# Patient Record
Sex: MALE | Race: WHITE | NOT HISPANIC OR LATINO | URBAN - METROPOLITAN AREA
[De-identification: names, ages, dates, MRNs, and addresses within clinical notes are randomized per-mention and may not be internally consistent; named-entity substitution may affect disease eponyms.]

---

## 2022-08-31 ENCOUNTER — EMERGENCY (EMERGENCY)
Facility: HOSPITAL | Age: 42
LOS: 0 days | Discharge: HOME | End: 2022-08-31
Attending: EMERGENCY MEDICINE | Admitting: EMERGENCY MEDICINE

## 2022-08-31 VITALS
SYSTOLIC BLOOD PRESSURE: 173 MMHG | HEART RATE: 95 BPM | RESPIRATION RATE: 18 BRPM | TEMPERATURE: 98 F | WEIGHT: 214.95 LBS | OXYGEN SATURATION: 100 % | DIASTOLIC BLOOD PRESSURE: 95 MMHG

## 2022-08-31 VITALS — SYSTOLIC BLOOD PRESSURE: 163 MMHG | DIASTOLIC BLOOD PRESSURE: 89 MMHG | HEART RATE: 94 BPM

## 2022-08-31 DIAGNOSIS — N48.30 PRIAPISM, UNSPECIFIED: ICD-10-CM

## 2022-08-31 LAB
BASE EXCESS BLDV CALC-SCNC: -17.4 MMOL/L — LOW (ref -2–3)
CA-I SERPL-SCNC: 1.13 MMOL/L — LOW (ref 1.15–1.33)
GAS PNL BLDV: 139 MMOL/L — SIGNIFICANT CHANGE UP (ref 136–145)
GAS PNL BLDV: SIGNIFICANT CHANGE UP
HCO3 BLDV-SCNC: 21 MMOL/L — LOW (ref 22–29)
HCT VFR BLDA CALC: 51 % — SIGNIFICANT CHANGE UP (ref 39–51)
HGB BLD CALC-MCNC: 17 G/DL — SIGNIFICANT CHANGE UP (ref 12.6–17.4)
LACTATE BLDV-MCNC: 14 MMOL/L — CRITICAL HIGH (ref 0.5–2)
PCO2 BLDV: 131 MMHG — HIGH (ref 42–55)
PH BLDV: 6.81 — LOW (ref 7.32–7.43)
PO2 BLDV: 25 MMHG — SIGNIFICANT CHANGE UP
POTASSIUM BLDV-SCNC: 5.2 MMOL/L — HIGH (ref 3.5–5.1)
SAO2 % BLDV: 27.5 % — SIGNIFICANT CHANGE UP

## 2022-08-31 PROCEDURE — 54220 IRRG CRPRA CAVRNOSA PRIAPISM: CPT

## 2022-08-31 PROCEDURE — 99284 EMERGENCY DEPT VISIT MOD MDM: CPT | Mod: 25

## 2022-08-31 NOTE — ED PROVIDER NOTE - OBJECTIVE STATEMENT
41 yo male, takes trazadone as sleep aid, presents to ed for painful erection for about 8 hrs, mild, aching, no radiation. Reports had intercourse last night, no use of any other medications. denies fever, chills, cp, sob, cough, numbness, tingling, dysuria, hematuria, testicular pain.

## 2022-08-31 NOTE — ED PROVIDER NOTE - PATIENT PORTAL LINK FT
You can access the FollowMyHealth Patient Portal offered by [facility] by registering at the following website: [email or web address]. By joining [employer]’s FollowMyHealth portal, you will also be able to view your health information using other applications (apps) compatible with our system.

## 2022-08-31 NOTE — ED PROVIDER NOTE - NSFOLLOWUPINSTRUCTIONS_ED_ALL_ED_FT
Our Emergency Department Referral Coordinators will be reaching out ot you in the next 24-48 hours from 9:00am to 5:00pm (Monday to Friday) with a follow up appointment. Please expect a phone call from the hospital in that time frame. If you do not receive a call or if you have any questions or concerns, you can reach them at [phone] or [phone].       Priapism      Priapism is an unwanted erection of the penis that lasts longer than 4 hours even without sexual stimulation or desire. Priapism affects males of all ages. There are three types of priapism:  •Acute ischemic priapism, also called low-flow priapism. This involves the failure of blood to flow out of the penis. With this type, erections are painful. It can lead to erectile dysfunction.      •Non-ischemic priapism, also called high-flow priapism. This involves too much flow of blood into the penis. With this type, erections are usually painless. The penis gets erect but not rigid.      •Stuttering (recurrent) priapism. This is a form of ischemic priapism in which erections may stop and start. With this type, erections usually occur during sleep and are painful. This type can lead to erectile dysfunction.        What are the causes?    This condition develops either when blood has difficulty leaving the penis (low-flow priapism) or when too much blood flows into the penis (high-flow priapism). Blood flow issues may be caused by:•Medicines, such as:  •Erectile dysfunction medicine. This is the most common cause.      •Medicine that is used to treat depression, anxiety, high blood pressure, or attention deficit hyperactivity disorder (ADHD).      •Blood thinners.      •Medical disorders, such as:  •Blood problems that are common in people who have sickle cell disease or leukemia.      •Diabetes.      •Neurological problems, such as multiple sclerosis.      •Tumors.      •An infection.        •An injury to the penis.      Other causes may include:  •Excessive alcohol use.      •Use of marijuana.      •Use of illegal drugs, such as cocaine.      In some cases, the cause is not known.      What are the signs or symptoms?    Symptoms of this condition include:  •A prolonged erection in the absence of stimulation.      •A painful erection.        How is this diagnosed?    This condition is diagnosed with a physical exam. Blood tests and imaging tests such as an ultrasound or MRI may be done to help identify the cause of the condition.      How is this treated?    Treatment for this condition depends on the cause and the type of priapism. Acute ischemic priapism should be treated right away at a hospital, where treatment may involve:  •Getting fluid and medicines for pain through an IV.      •Having a procedure to drain blood from the penis.      •Having surgery to make a passageway for blood to flow in the penis (surgical shunting).      •Having a blood transfusion if you have sickle cell disease.      No standard treatment exists for stuttering priapism. If the erections last longer than 4 hours, the treatments will be the same as the treatments for acute ischemic priapism.    Non-ischemic priapism and stuttering priapism are often managed at home. Your health care provider may prescribe oral medicines that may help control or decrease the erections.      Follow these instructions at home:    Medicines     •Take over-the-counter and prescription medicines only as told by your health care provider.      • Do not take any medicines during an episode unless you get approval from your health care provider.        General instructions       A person writing in a notebook.       A bathtub filled with water.     •Avoid drugs or alcohol if they caused the priapism. Avoiding them can help to prevent the condition from coming back.      •Avoid sexual stimulation and intercourse until your health care provider says that they are okay for you.      •Keep track of how long your erection lasts. If it does not go away in 4 hours, seek medical care.    •Follow instructions from your health care provider. Your health care provider may tell you to:  •Try exercising or taking a warm bath.      •Put a cold pack and gentle pressure on your perineum, which is the area between the anus and the scrotum.      •Take a cold shower.      •Drink enough fluid to keep your urine pale yellow.      •Empty your bladder as much as possible.        •Keep all follow-up visits. This is important.        Contact a health care provider if:    •Your pain does not improve with treatment.      •You have a fever.      •You have more pain, swelling, or redness in your genitals or your groin area.        Get help right away if:    •You have acute ischemic priapism or stuttering priapism and your erection does not go away in 4 hours.        Summary    •Priapism is an unwanted erection of the penis that usually develops without sexual stimulation or desire.      •Treatment for this condition depends on the cause and the type of priapism. Non-ischemic priapism and stuttering priapism are often managed at home. Acute ischemic priapism is usually treated at a hospital.      •Avoid drugs or alcohol if they caused the priapism. This can help to prevent the condition from coming back.      •Take over-the-counter and prescription medicines only as told by your health care provider.      •Keep track of how long your erection lasts. If it does not go away in 4 hours, seek medical care.      This information is not intended to replace advice given to you by your health care provider. Make sure you discuss any questions you have with your health care provider.

## 2022-08-31 NOTE — ED PROVIDER NOTE - CLINICAL SUMMARY MEDICAL DECISION MAKING FREE TEXT BOX
Patient presented with interaction lasted approximately 7 to 8 hours.  Patient had intercourse last night and states that was normal.  Patient took trazodone which she states he has been taking for a while now.  Went to sleep.  States that he was flaccid when he went to sleep and then when he awoke at 6 AM, states that he had an erection that persisted until arrival to ED.  Denies testicular pain, penile discharge, penile lesions, fevers or any other complaints.  Denies trauma to the penis.  On arrival patient afebrile, hemodynamically stable, but erection noted.  No overlying skin changes or testicular tenderness.  Patient circumcised.  Procedure performed as documented with aspiration of approximately 40 cc of blood on bilateral sides of penis and sample sent on VBG which is consistent with low-flow etiology.  After aspiration, injected bilateral penis with phenylephrine 100 cc each side which ultimately allowed for resolution.  Urology was consulted and evaluated patient in the ED.  Per urology, no further intervention at this time and patient may follow-up as outpatient with them.  Patient agreeable with plan, able to urinate. Agrees to return to ED for any new or worsening symptoms.

## 2022-08-31 NOTE — ED ADULT NURSE NOTE - NS PRO PASSIVE SMOKE EXP
Fax message: We have been contacted by this pt for incontinence supplies. If you feel this pt is able to get these products:    Briefs/Pull-Ups, Chuz Gloves and Pads.    -Include all assoc. Dx codes for pt so we can bill product to their insurance.  -Note; basic dz code R32 does not qualify for incontinence product.  -Please include refills or PRN. PRN = Good for 1 year.  -All Rx's must have a signature or electronic signature.  -Please don't add quantity's. As insurance allowances differ.  -If you feel this pt does not qualify please state on this form and send it back to me ASAP.  Fax copy back to [phone]   No

## 2022-08-31 NOTE — ED PROVIDER NOTE - PHYSICAL EXAMINATION
Physical Exam    Vital Signs: I have reviewed the initial vital signs.  Constitutional: appears stated age, no acute distress  Eyes: Conjunctiva pink, Sclera clear  Cardiovascular: S1 and S2, regular rate, regular rhythm, well-perfused extremities, radial pulses equal and 2+, pedal pulses 2+ and equal  Respiratory: unlabored respiratory effort, clear to auscultation bilaterally no wheezing, rales and rhonchi  Gastrointestinal: soft, non-tender abdomen, no pulsatile mass, normal bowl sounds  : erect penis, no testicular ttp.   Musculoskeletal: supple neck, no lower extremity edema, no midline tenderness  Integumentary: warm, dry, no rash  Neurologic: awake, alert, nvi

## 2022-08-31 NOTE — ED PROVIDER NOTE - PROGRESS NOTE DETAILS
FROST: Approximately 40cc blood aspirated on each side, VBG sample sent - consistent with low flow etiology. Injected 100mcg phenylephrine to each side of penis with improvement and ice packs applied. Urology evaluated patient in ED - no further phenylephrine required at this time. Recommending observation for 1 hour post phenylephrine and if erection does not recur, can follow up outpatient with Dr. Snow SANTOSH: Approximately 40cc blood aspirated on each side, VBG sample sent - consistent with low flow etiology. Injected 100mcg phenylephrine to each side of penis with improvement and ice packs applied. Urology Parish Ribeiro evaluated patient in ED - no further phenylephrine required at this time. Recommending observation for 1 hour post phenylephrine and if erection does not recur, can follow up outpatient with Dr. Snow pt priapism resolved, feels well, will f/u with urology outpt. advised to stop trazadone and d/w prescribing physician.

## 2022-08-31 NOTE — ED PROVIDER NOTE - NS ED ROS FT
Constitutional: (-) fever, (-) chills  Eyes: (-) visual changes  ENT: (-) nasal congestions  Cardiovascular: (-) chest pain, (-) syncope  Respiratory: (-) cough, (-) shortness of breath, (-) dyspnea,   Gastrointestinal: (-) vomiting, (-) diarrhea, (-)nausea,  Musculoskeletal: (-) neck pain, (-) back pain, (-) joint pain,  Integumentary: (-) rash, (-) edema, (-) bruises  Neurological: (-) headache, (-) loc, (-) dizziness, (-) tingling, (-)numbness  Peripheral Vascular: (-) leg swelling  :  (-)dysuria,  (-) hematuria (+) painful erection   Allergic/Immunologic: (-) pruritus

## 2022-08-31 NOTE — ED PROCEDURE NOTE - GENERAL PROCEDURE DETAILS
aspiration of 40 cc of blood each side of penis, injection of 1 cc phenylephrine each side, priapism reduced. aspiration of 40 cc of blood each side of penis, injection of 1 cc (100mcg) phenylephrine each side, priapism reduced.

## 2022-09-08 ENCOUNTER — APPOINTMENT (OUTPATIENT)
Dept: UROLOGY | Facility: CLINIC | Age: 42
End: 2022-09-08

## 2022-09-08 PROBLEM — Z00.00 ENCOUNTER FOR PREVENTIVE HEALTH EXAMINATION: Status: ACTIVE | Noted: 2022-09-08

## 2023-11-18 ENCOUNTER — NON-APPOINTMENT (OUTPATIENT)
Age: 43
End: 2023-11-18

## 2025-07-11 PROBLEM — Z78.9 OTHER SPECIFIED HEALTH STATUS: Chronic | Status: ACTIVE | Noted: 2022-08-31

## 2025-07-18 ENCOUNTER — EMERGENCY (EMERGENCY)
Facility: HOSPITAL | Age: 45
LOS: 0 days | Discharge: HOME | End: 2025-07-18
Attending: EMERGENCY MEDICINE
Payer: COMMERCIAL

## 2025-07-18 VITALS
DIASTOLIC BLOOD PRESSURE: 91 MMHG | TEMPERATURE: 98 F | RESPIRATION RATE: 19 BRPM | SYSTOLIC BLOOD PRESSURE: 145 MMHG | HEART RATE: 86 BPM | OXYGEN SATURATION: 95 %

## 2025-07-18 VITALS
DIASTOLIC BLOOD PRESSURE: 60 MMHG | SYSTOLIC BLOOD PRESSURE: 113 MMHG | OXYGEN SATURATION: 96 % | RESPIRATION RATE: 18 BRPM | HEART RATE: 81 BPM

## 2025-07-18 DIAGNOSIS — R79.89 OTHER SPECIFIED ABNORMAL FINDINGS OF BLOOD CHEMISTRY: ICD-10-CM

## 2025-07-18 DIAGNOSIS — N17.9 ACUTE KIDNEY FAILURE, UNSPECIFIED: ICD-10-CM

## 2025-07-18 DIAGNOSIS — T40.2X1A POISONING BY OTHER OPIOIDS, ACCIDENTAL (UNINTENTIONAL), INITIAL ENCOUNTER: ICD-10-CM
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
## 2025-07-18 DIAGNOSIS — R07.9 CHEST PAIN, UNSPECIFIED: ICD-10-CM

## 2025-07-18 DIAGNOSIS — E87.5 HYPERKALEMIA: ICD-10-CM

## 2025-07-18 DIAGNOSIS — Z53.29 PROCEDURE AND TREATMENT NOT CARRIED OUT BECAUSE OF PATIENT'S DECISION FOR OTHER REASONS: ICD-10-CM

## 2025-07-18 DIAGNOSIS — R94.31 ABNORMAL ELECTROCARDIOGRAM [ECG] [EKG]: ICD-10-CM

## 2025-07-18 DIAGNOSIS — R07.89 OTHER CHEST PAIN: ICD-10-CM

## 2025-07-18 DIAGNOSIS — I10 ESSENTIAL (PRIMARY) HYPERTENSION: ICD-10-CM

## 2025-07-18 DIAGNOSIS — S20.211A CONTUSION OF RIGHT FRONT WALL OF THORAX, INITIAL ENCOUNTER: ICD-10-CM
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
## 2025-07-18 LAB
ALBUMIN SERPL ELPH-MCNC: 4.2 G/DL — SIGNIFICANT CHANGE UP (ref 3.5–5.2)
ALBUMIN SERPL ELPH-MCNC: 4.6 G/DL — SIGNIFICANT CHANGE UP (ref 3.5–5.2)
ALP SERPL-CCNC: 67 U/L — SIGNIFICANT CHANGE UP (ref 30–115)
ALP SERPL-CCNC: 78 U/L — SIGNIFICANT CHANGE UP (ref 30–115)
ALT FLD-CCNC: 83 U/L — HIGH (ref 0–41)
ALT FLD-CCNC: 97 U/L — HIGH (ref 0–41)
ANION GAP SERPL CALC-SCNC: 12 MMOL/L — SIGNIFICANT CHANGE UP (ref 7–14)
ANION GAP SERPL CALC-SCNC: 12 MMOL/L — SIGNIFICANT CHANGE UP (ref 7–14)
APAP SERPL-MCNC: 5 UG/ML — LOW (ref 10–30)
APPEARANCE UR: CLEAR — SIGNIFICANT CHANGE UP
AST SERPL-CCNC: 68 U/L — HIGH (ref 0–41)
AST SERPL-CCNC: 77 U/L — HIGH (ref 0–41)
BASOPHILS # BLD AUTO: 0.03 K/UL — SIGNIFICANT CHANGE UP (ref 0–0.2)
BASOPHILS NFR BLD AUTO: 0.2 % — SIGNIFICANT CHANGE UP (ref 0–1)
BILIRUB SERPL-MCNC: 0.5 MG/DL — SIGNIFICANT CHANGE UP (ref 0.2–1.2)
BILIRUB SERPL-MCNC: 0.5 MG/DL — SIGNIFICANT CHANGE UP (ref 0.2–1.2)
BILIRUB UR-MCNC: NEGATIVE — SIGNIFICANT CHANGE UP
BUN SERPL-MCNC: 24 MG/DL — HIGH (ref 10–20)
BUN SERPL-MCNC: 26 MG/DL — HIGH (ref 10–20)
CALCIUM SERPL-MCNC: 9.3 MG/DL — SIGNIFICANT CHANGE UP (ref 8.4–10.5)
CALCIUM SERPL-MCNC: 9.5 MG/DL — SIGNIFICANT CHANGE UP (ref 8.4–10.5)
CHLORIDE SERPL-SCNC: 106 MMOL/L — SIGNIFICANT CHANGE UP (ref 98–110)
CHLORIDE SERPL-SCNC: 106 MMOL/L — SIGNIFICANT CHANGE UP (ref 98–110)
CK SERPL-CCNC: 609 U/L — HIGH (ref 0–225)
CO2 SERPL-SCNC: 20 MMOL/L — SIGNIFICANT CHANGE UP (ref 17–32)
CO2 SERPL-SCNC: 22 MMOL/L — SIGNIFICANT CHANGE UP (ref 17–32)
COLOR SPEC: SIGNIFICANT CHANGE UP
CREAT SERPL-MCNC: 1.6 MG/DL — HIGH (ref 0.7–1.5)
CREAT SERPL-MCNC: 1.6 MG/DL — HIGH (ref 0.7–1.5)
DIFF PNL FLD: NEGATIVE — SIGNIFICANT CHANGE UP
DRUG SCREEN 1, URINE RESULT: SIGNIFICANT CHANGE UP
EGFR: 54 ML/MIN/1.73M2 — LOW
EOSINOPHIL # BLD AUTO: 0.09 K/UL — SIGNIFICANT CHANGE UP (ref 0–0.7)
EOSINOPHIL NFR BLD AUTO: 0.7 % — SIGNIFICANT CHANGE UP (ref 0–8)
GLUCOSE SERPL-MCNC: 84 MG/DL — SIGNIFICANT CHANGE UP (ref 70–99)
GLUCOSE SERPL-MCNC: 93 MG/DL — SIGNIFICANT CHANGE UP (ref 70–99)
GLUCOSE UR QL: NEGATIVE MG/DL — SIGNIFICANT CHANGE UP
HCT VFR BLD CALC: 46.4 % — SIGNIFICANT CHANGE UP (ref 42–52)
HGB BLD-MCNC: 15.8 G/DL — SIGNIFICANT CHANGE UP (ref 14–18)
IMM GRANULOCYTES NFR BLD AUTO: 0.6 % — HIGH (ref 0.1–0.3)
KETONES UR QL: ABNORMAL MG/DL
LEUKOCYTE ESTERASE UR-ACNC: NEGATIVE — SIGNIFICANT CHANGE UP
LYMPHOCYTES # BLD AUTO: 1.09 K/UL — LOW (ref 1.2–3.4)
LYMPHOCYTES # BLD AUTO: 8.8 % — LOW (ref 20.5–51.1)
MCHC RBC-ENTMCNC: 30.2 PG — SIGNIFICANT CHANGE UP (ref 27–31)
MCHC RBC-ENTMCNC: 34.1 G/DL — SIGNIFICANT CHANGE UP (ref 32–37)
MCV RBC AUTO: 88.5 FL — SIGNIFICANT CHANGE UP (ref 80–94)
MONOCYTES # BLD AUTO: 0.91 K/UL — HIGH (ref 0.1–0.6)
MONOCYTES NFR BLD AUTO: 7.4 % — SIGNIFICANT CHANGE UP (ref 1.7–9.3)
NEUTROPHILS # BLD AUTO: 10.15 K/UL — HIGH (ref 1.4–6.5)
NEUTROPHILS NFR BLD AUTO: 82.3 % — HIGH (ref 42.2–75.2)
NITRITE UR-MCNC: NEGATIVE — SIGNIFICANT CHANGE UP
NRBC BLD AUTO-RTO: 0 /100 WBCS — SIGNIFICANT CHANGE UP (ref 0–0)
PH UR: 5.5 — SIGNIFICANT CHANGE UP (ref 5–8)
PLATELET # BLD AUTO: 294 K/UL — SIGNIFICANT CHANGE UP (ref 130–400)
PMV BLD: 9.2 FL — SIGNIFICANT CHANGE UP (ref 7.4–10.4)
POTASSIUM SERPL-MCNC: 5.2 MMOL/L — HIGH (ref 3.5–5)
POTASSIUM SERPL-MCNC: 5.8 MMOL/L — HIGH (ref 3.5–5)
POTASSIUM SERPL-SCNC: 5.2 MMOL/L — HIGH (ref 3.5–5)
POTASSIUM SERPL-SCNC: 5.8 MMOL/L — HIGH (ref 3.5–5)
PROT SERPL-MCNC: 6.9 G/DL — SIGNIFICANT CHANGE UP (ref 6–8)
PROT SERPL-MCNC: 7.9 G/DL — SIGNIFICANT CHANGE UP (ref 6–8)
PROT UR-MCNC: SIGNIFICANT CHANGE UP MG/DL
RBC # BLD: 5.24 M/UL — SIGNIFICANT CHANGE UP (ref 4.7–6.1)
RBC # FLD: 12.9 % — SIGNIFICANT CHANGE UP (ref 11.5–14.5)
SALICYLATES SERPL-MCNC: <0.3 MG/DL — LOW (ref 4–30)
SODIUM SERPL-SCNC: 138 MMOL/L — SIGNIFICANT CHANGE UP (ref 135–146)
SODIUM SERPL-SCNC: 140 MMOL/L — SIGNIFICANT CHANGE UP (ref 135–146)
SP GR SPEC: 1.03 — SIGNIFICANT CHANGE UP (ref 1–1.03)
TROPONIN T, HIGH SENSITIVITY RESULT: 10 NG/L — SIGNIFICANT CHANGE UP (ref 6–21)
TROPONIN T, HIGH SENSITIVITY RESULT: 9 NG/L — SIGNIFICANT CHANGE UP (ref 6–21)
UROBILINOGEN FLD QL: 1 MG/DL — SIGNIFICANT CHANGE UP (ref 0.2–1)
WBC # BLD: 12.35 K/UL — HIGH (ref 4.8–10.8)
WBC # FLD AUTO: 12.35 K/UL — HIGH (ref 4.8–10.8)

## 2025-07-18 PROCEDURE — 93005 ELECTROCARDIOGRAM TRACING: CPT

## 2025-07-18 PROCEDURE — 80053 COMPREHEN METABOLIC PANEL: CPT

## 2025-07-18 PROCEDURE — 80354 DRUG SCREENING FENTANYL: CPT

## 2025-07-18 PROCEDURE — 99285 EMERGENCY DEPT VISIT HI MDM: CPT | Mod: 25

## 2025-07-18 PROCEDURE — 71045 X-RAY EXAM CHEST 1 VIEW: CPT

## 2025-07-18 PROCEDURE — 82550 ASSAY OF CK (CPK): CPT

## 2025-07-18 PROCEDURE — 36415 COLL VENOUS BLD VENIPUNCTURE: CPT

## 2025-07-18 PROCEDURE — 80307 DRUG TEST PRSMV CHEM ANLYZR: CPT

## 2025-07-18 PROCEDURE — 81003 URINALYSIS AUTO W/O SCOPE: CPT

## 2025-07-18 PROCEDURE — 96374 THER/PROPH/DIAG INJ IV PUSH: CPT

## 2025-07-18 PROCEDURE — 93010 ELECTROCARDIOGRAM REPORT: CPT

## 2025-07-18 PROCEDURE — 99285 EMERGENCY DEPT VISIT HI MDM: CPT

## 2025-07-18 PROCEDURE — 71045 X-RAY EXAM CHEST 1 VIEW: CPT | Mod: 26

## 2025-07-18 PROCEDURE — 84484 ASSAY OF TROPONIN QUANT: CPT

## 2025-07-18 PROCEDURE — 85025 COMPLETE CBC W/AUTO DIFF WBC: CPT

## 2025-07-18 PROCEDURE — 80353 DRUG SCREENING COCAINE: CPT

## 2025-07-18 PROCEDURE — 71250 CT THORAX DX C-: CPT

## 2025-07-18 PROCEDURE — 71250 CT THORAX DX C-: CPT | Mod: 26

## 2025-07-18 RX ORDER — KETOROLAC TROMETHAMINE 30 MG/ML
30 INJECTION, SOLUTION INTRAMUSCULAR; INTRAVENOUS ONCE
Refills: 0 | Status: DISCONTINUED | OUTPATIENT
Start: 2025-07-18 | End: 2025-07-18

## 2025-07-18 RX ORDER — ACETAMINOPHEN 500 MG/5ML
975 LIQUID (ML) ORAL ONCE
Refills: 0 | Status: COMPLETED | OUTPATIENT
Start: 2025-07-18 | End: 2025-07-18

## 2025-07-18 RX ORDER — SODIUM ZIRCONIUM CYCLOSILICATE 5 G/5G
10 POWDER, FOR SUSPENSION ORAL ONCE
Refills: 0 | Status: COMPLETED | OUTPATIENT
Start: 2025-07-18 | End: 2025-07-18

## 2025-07-18 RX ORDER — SODIUM CHLORIDE 9 G/1000ML
1000 INJECTION, SOLUTION INTRAVENOUS ONCE
Refills: 0 | Status: COMPLETED | OUTPATIENT
Start: 2025-07-18 | End: 2025-07-18

## 2025-07-18 RX ADMIN — SODIUM ZIRCONIUM CYCLOSILICATE 10 GRAM(S): 5 POWDER, FOR SUSPENSION ORAL at 17:04

## 2025-07-18 RX ADMIN — SODIUM CHLORIDE 1000 MILLILITER(S): 9 INJECTION, SOLUTION INTRAVENOUS at 17:06

## 2025-07-18 RX ADMIN — SODIUM CHLORIDE 1000 MILLILITER(S): 9 INJECTION, SOLUTION INTRAVENOUS at 15:53

## 2025-07-18 RX ADMIN — Medication 975 MILLIGRAM(S): at 15:53

## 2025-07-18 RX ADMIN — KETOROLAC TROMETHAMINE 30 MILLIGRAM(S): 30 INJECTION, SOLUTION INTRAMUSCULAR; INTRAVENOUS at 16:33

## 2025-07-18 NOTE — ED PROVIDER NOTE - CLINICAL SUMMARY MEDICAL DECISION MAKING FREE TEXT BOX
Pt evaluated after sign out. Work up reviewed. CT/VBG/trop pending. Pt wants to leave AMA prior to results coming back and does not want to be admitted.    The patient has decided to leave against medical advice.  The patient is AAOx3, not intoxicated, and displays normal decision making ability. We discussed all risks, benefits, and alternatives to the progression of treatment and the potential dangers of leaving including but not limited to permanent disability, injury, and death.  The patient was instructed that they are welcome to change their decision to leave against medical advice and return to the emergency department at any time and for any reason in order to allow us to render care.    Wife at bedside. Risks of leaving AMA explained to pt, which include disability and death. Pt verbalizes understanding and still wants to leave.
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
## 2025-07-18 NOTE — ED PROVIDER NOTE - PROGRESS NOTE DETAILS
ROXANNE: placed on EtCO2 s/p heroin overdose requiring narcan though remains awake, alert and answering questions appropriately    EKG grossly abnormal, shows elevation in 2, V4, V5, depressions in AVR,  Q waves inferiorly. Troponin 10. Cardio consulted who performed bedside ECHO - states WNL. Requesting repeat trop/ekg and admission for echo in setting of abnormal ekg. Pt still reporting chest pain after acetaminophen and toradol, CXR WNL. CT chest ordered    Labs show hyperkalemia, elevated LFT's, elevated creatinine. Will repeat after IVF. UA, UDS, CK, acetaminophen/salicylate levels added.     Pt signed out to Dr. Lai pending these labs, 2nd trop/ekg @6, CT chest, UA and reassessment/disposition
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
## 2025-07-18 NOTE — ED PROVIDER NOTE - PATIENT PORTAL LINK FT
You can access the FollowMyHealth Patient Portal offered by [facility] by registering at the following website: [email or web address]. By joining [employer]’s FollowMyHealth portal, you will also be able to view your health information using other applications (apps) compatible with our system.

## 2025-07-18 NOTE — ED PROVIDER NOTE - DIFFERENTIAL DIAGNOSIS
Differential Diagnosis opioid/polysubstance overdose, rhabdomyolysis  rib frx/sternum frx, pneumothorax, ACS, electrolyte abnormality
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
## 2025-07-18 NOTE — ED PROVIDER NOTE - CARE PLAN
1 Principal Discharge DX:	Opioid overdose  Secondary Diagnosis:	Hyperkalemia  Secondary Diagnosis:	RICARDA (acute kidney injury)  Secondary Diagnosis:	Elevated LFTs  Secondary Diagnosis:	Chest pain  Secondary Diagnosis:	Abnormal EKG

## 2025-07-18 NOTE — ED PROVIDER NOTE - ST/T WAVE
elevations in lead 2, V4, V5, but not in 3 contiguous leads, no reciprocal changes. NO STEMI. TWI in AVR. Q waves in inferior and lateral leads.

## 2025-07-18 NOTE — ED ADULT NURSE NOTE - NSFALLUNIVINTERV_ED_ALL_ED
Bed/Stretcher in lowest position, wheels locked, appropriate side rails in place/Call bell, personal items and telephone in reach/Instruct patient to call for assistance before getting out of bed/chair/stretcher/Non-slip footwear applied when patient is off stretcher/[place] to call system/Physically safe environment - no spills, clutter or unnecessary equipment/Purposeful proactive rounding/Room/bathroom lighting operational, light cord in reach

## 2025-07-21 ENCOUNTER — TRANSCRIPTION ENCOUNTER (OUTPATIENT)
Age: 45
End: 2025-07-21

## 2025-08-14 ENCOUNTER — APPOINTMENT (OUTPATIENT)
Dept: NEUROSURGERY | Facility: CLINIC | Age: 45
End: 2025-08-14

## 2025-08-14 VITALS — BODY MASS INDEX: 30.48 KG/M2 | WEIGHT: 230 LBS | HEIGHT: 73 IN

## 2025-08-14 DIAGNOSIS — M43.17 SPONDYLOLISTHESIS, LUMBOSACRAL REGION: ICD-10-CM

## 2025-08-14 DIAGNOSIS — M43.07 SPONDYLOLYSIS, LUMBOSACRAL REGION: ICD-10-CM

## 2025-08-14 PROCEDURE — 99204 OFFICE O/P NEW MOD 45 MIN: CPT

## 2025-08-14 RX ORDER — LOSARTAN POTASSIUM 100 MG/1
TABLET, FILM COATED ORAL
Refills: 0 | Status: ACTIVE | COMMUNITY